# Patient Record
Sex: MALE | Race: WHITE | Employment: FULL TIME | URBAN - METROPOLITAN AREA
[De-identification: names, ages, dates, MRNs, and addresses within clinical notes are randomized per-mention and may not be internally consistent; named-entity substitution may affect disease eponyms.]

---

## 2018-06-13 ENCOUNTER — OFFICE VISIT (OUTPATIENT)
Dept: FAMILY MEDICINE CLINIC | Age: 48
End: 2018-06-13
Payer: COMMERCIAL

## 2018-06-13 VITALS
HEART RATE: 96 BPM | HEIGHT: 73 IN | DIASTOLIC BLOOD PRESSURE: 82 MMHG | SYSTOLIC BLOOD PRESSURE: 130 MMHG | WEIGHT: 209 LBS | TEMPERATURE: 97.8 F | OXYGEN SATURATION: 96 % | BODY MASS INDEX: 27.7 KG/M2

## 2018-06-13 DIAGNOSIS — M54.40 BACK PAIN OF LUMBAR REGION WITH SCIATICA: Primary | ICD-10-CM

## 2018-06-13 PROCEDURE — 99213 OFFICE O/P EST LOW 20 MIN: CPT | Performed by: NURSE PRACTITIONER

## 2018-06-13 RX ORDER — TIZANIDINE 4 MG/1
TABLET ORAL
Refills: 0 | Status: ON HOLD | COMMUNITY
Start: 2018-06-11 | End: 2018-08-16 | Stop reason: ALTCHOICE

## 2018-06-13 RX ORDER — ATORVASTATIN CALCIUM 20 MG/1
TABLET, FILM COATED ORAL
Refills: 3 | COMMUNITY
Start: 2018-05-04 | End: 2019-04-25

## 2018-06-13 RX ORDER — PAROXETINE HYDROCHLORIDE 20 MG/1
TABLET, FILM COATED ORAL
Refills: 3 | COMMUNITY
Start: 2018-05-06 | End: 2019-04-25

## 2018-06-13 RX ORDER — METHYLPREDNISOLONE 4 MG/1
TABLET ORAL
Qty: 1 KIT | Refills: 0 | Status: SHIPPED | OUTPATIENT
Start: 2018-06-13 | End: 2018-06-19

## 2018-06-13 RX ORDER — MELOXICAM 7.5 MG/1
TABLET ORAL
Refills: 0 | COMMUNITY
Start: 2018-06-11 | End: 2018-06-15 | Stop reason: ALTCHOICE

## 2018-06-13 RX ORDER — PAROXETINE 10 MG/1
TABLET, FILM COATED ORAL
Refills: 3 | COMMUNITY
Start: 2018-05-06 | End: 2019-04-25

## 2018-06-13 ASSESSMENT — ENCOUNTER SYMPTOMS
BOWEL INCONTINENCE: 0
ABDOMINAL PAIN: 0
BACK PAIN: 1

## 2018-06-15 ENCOUNTER — OFFICE VISIT (OUTPATIENT)
Dept: FAMILY MEDICINE CLINIC | Age: 48
End: 2018-06-15
Payer: COMMERCIAL

## 2018-06-15 VITALS
OXYGEN SATURATION: 97 % | HEIGHT: 73 IN | BODY MASS INDEX: 27.73 KG/M2 | HEART RATE: 82 BPM | WEIGHT: 209.2 LBS | SYSTOLIC BLOOD PRESSURE: 126 MMHG | DIASTOLIC BLOOD PRESSURE: 80 MMHG | TEMPERATURE: 98.3 F

## 2018-06-15 DIAGNOSIS — F34.1 DYSTHYMIA: ICD-10-CM

## 2018-06-15 DIAGNOSIS — M25.552 LEFT HIP PAIN: ICD-10-CM

## 2018-06-15 DIAGNOSIS — M51.36 DDD (DEGENERATIVE DISC DISEASE), LUMBAR: ICD-10-CM

## 2018-06-15 DIAGNOSIS — M54.32 SCIATICA OF LEFT SIDE: Primary | ICD-10-CM

## 2018-06-15 PROCEDURE — 99214 OFFICE O/P EST MOD 30 MIN: CPT | Performed by: NURSE PRACTITIONER

## 2018-06-15 RX ORDER — GABAPENTIN 100 MG/1
100 CAPSULE ORAL 3 TIMES DAILY
Qty: 90 CAPSULE | Refills: 0 | Status: ON HOLD | OUTPATIENT
Start: 2018-06-15 | End: 2018-08-16 | Stop reason: ALTCHOICE

## 2018-06-15 ASSESSMENT — ENCOUNTER SYMPTOMS
BACK PAIN: 1
ABDOMINAL PAIN: 0
EYE DISCHARGE: 0
BLOOD IN STOOL: 0
SHORTNESS OF BREATH: 0
COUGH: 0

## 2018-06-15 ASSESSMENT — PATIENT HEALTH QUESTIONNAIRE - PHQ9
SUM OF ALL RESPONSES TO PHQ9 QUESTIONS 1 & 2: 0
1. LITTLE INTEREST OR PLEASURE IN DOING THINGS: 0
SUM OF ALL RESPONSES TO PHQ QUESTIONS 1-9: 0
2. FEELING DOWN, DEPRESSED OR HOPELESS: 0

## 2018-06-29 ENCOUNTER — HOSPITAL ENCOUNTER (OUTPATIENT)
Age: 48
Discharge: HOME | End: 2018-07-01
Payer: COMMERCIAL

## 2018-06-29 ENCOUNTER — HOSPITAL ENCOUNTER (OUTPATIENT)
Dept: GENERAL RADIOLOGY | Age: 48
Discharge: HOME | End: 2018-07-01
Payer: COMMERCIAL

## 2018-06-29 ENCOUNTER — OFFICE VISIT (OUTPATIENT)
Dept: FAMILY MEDICINE CLINIC | Age: 48
End: 2018-06-29
Payer: COMMERCIAL

## 2018-06-29 VITALS
TEMPERATURE: 97.5 F | HEART RATE: 68 BPM | SYSTOLIC BLOOD PRESSURE: 126 MMHG | BODY MASS INDEX: 27.83 KG/M2 | DIASTOLIC BLOOD PRESSURE: 74 MMHG | OXYGEN SATURATION: 98 % | HEIGHT: 73 IN | WEIGHT: 210 LBS

## 2018-06-29 DIAGNOSIS — M25.552 LEFT HIP PAIN: ICD-10-CM

## 2018-06-29 DIAGNOSIS — M51.36 DDD (DEGENERATIVE DISC DISEASE), LUMBAR: ICD-10-CM

## 2018-06-29 DIAGNOSIS — M25.552 LEFT HIP PAIN: Primary | ICD-10-CM

## 2018-06-29 DIAGNOSIS — M54.32 SCIATICA OF LEFT SIDE: ICD-10-CM

## 2018-06-29 PROCEDURE — 73502 X-RAY EXAM HIP UNI 2-3 VIEWS: CPT

## 2018-06-29 PROCEDURE — 99213 OFFICE O/P EST LOW 20 MIN: CPT | Performed by: NURSE PRACTITIONER

## 2018-06-29 RX ORDER — HYDROCODONE BITARTRATE AND ACETAMINOPHEN 5; 325 MG/1; MG/1
TABLET ORAL
COMMUNITY
Start: 2018-06-25 | End: 2018-07-06 | Stop reason: ALTCHOICE

## 2018-06-29 RX ORDER — CYCLOBENZAPRINE HCL 10 MG
10 TABLET ORAL 3 TIMES DAILY PRN
Qty: 30 TABLET | Refills: 0 | Status: SHIPPED | OUTPATIENT
Start: 2018-06-29 | End: 2018-07-05 | Stop reason: SDUPTHER

## 2018-06-29 RX ORDER — CYCLOBENZAPRINE HCL 10 MG
TABLET ORAL
COMMUNITY
Start: 2018-06-25 | End: 2018-06-29 | Stop reason: SDUPTHER

## 2018-06-29 ASSESSMENT — ENCOUNTER SYMPTOMS
EYE DISCHARGE: 0
ABDOMINAL PAIN: 0
BLOOD IN STOOL: 0
BACK PAIN: 0
COUGH: 0
SHORTNESS OF BREATH: 0

## 2018-07-03 ENCOUNTER — HOSPITAL ENCOUNTER (OUTPATIENT)
Dept: PHYSICAL THERAPY | Facility: CLINIC | Age: 48
Setting detail: THERAPIES SERIES
Discharge: HOME | End: 2018-07-03
Payer: COMMERCIAL

## 2018-07-03 PROCEDURE — 97140 MANUAL THERAPY 1/> REGIONS: CPT

## 2018-07-03 PROCEDURE — 97161 PT EVAL LOW COMPLEX 20 MIN: CPT

## 2018-07-03 NOTE — FLOWSHEET NOTE
Disability  [] Other:           Return to work:   Job/ADL Description:      Pain:  [] Yes  [] No Location: L hip, L leg Pain Rating: (0-10 scale) 6-7/10 now, 8/10 at worst with sitting for long periods of time. Pain altered Tx:  [] Yes  [] No  Action:  Symptoms:  [] Improving [x] Worsening [] Same  Better:  [] AM    [] PM    [] Sit    [] Rise/Sit    []Stand    [] Walk    [x] Lying    [] Other:  Worse: [] AM    [] PM    [x] Sit    [] Rise/Sit    []Stand    [] Walk    [] Lying    [] Bend                             [] Valsalva    [] Other:  Sleep: [] OK    [x] Disturbed: when rolls over onto L hip but can go back to sleep     Objective:   STRENGTH  ROM    Left Right Cervical All wnl   L1-2 Hip Flex wnl wnl Flexion    Hip Abd wnl wnl Extension    L3-4 Knee Ext wnl wnl Rotation L R   L4 Ankle DF wnl wnl Sidebend L R   L5 EHL NT NT Retraction    S1 Plant. Flex wnl wnl Lumbar    Abdominals   Flexion wnl   Erector Spinae   Extension wnl      Rotation L  wnl R  wnl   Hip ext 5  Sidebend L  wnl R  wnl   Hip ER 5   LE     Glute med  5  Hip ER  wnl wnl      Hip IR  limited  limited       Ext  <10 deg <20 deg                         TESTS (+/-) LEFT RIGHT Not Tested   SLR [] sit [] supine - - []   Hamstring (SLR) + tightness + tightness  []   SKTC   []   DKTC   []   Slump/Dural -  []   SI JT   []   GRACIE + - []   Joint Mobility wnl wnl []   Christie Tests ? Pain ?  Pain No Change Not Tested   RFIS [] [] [x] []   FAM [] [] [x] []   RFIL [] [] [x] []   REIL [] [] [x] []   Rep Prot [] [] [] [x]   Rep Retract [] [] [] [x]       OBSERVATION No Deficit Deficit Not Tested Comments   Posture       Forward Head [x] [] []    Rounded Shoulders [x] [] []    Kyphosis [] [] []    Lordosis [] [] []    Lateral Shift [] [] []    Scoliosis [] [] []    Iliac Crest [] [] []    PSIS [] [] []    ASIS [] [x] []    Genu Valgus [] [] []    Genu Varus [] [] []    Genu Recurvatum [] [] []    Pronation [] [] []    Supination [] [] []    Leg Length Discrp [] [] []    Slumped Sitting [] [] []    Palpation [] [x] [] TTP over piriformis and hip flexors    Sensation [x] [] []    Edema [x] [] []    Neurological [] [x] []      FUNCTION Normal Difficult Unable   Sitting [] [] [x]   Standing [x] [] []   Ambulation [x] [] []   Groom/Dress [x] [] []   Lift/Carry [x] []  []   Stairs [] [] []   Bending [] [] []   OH reach [] [] []   Sit to Stand [] [] []     Comments:  LEFS 53/80 indicating 34% disability   B and SL squat: does not reproduce sx. Slight quad dominance but overall good form. Joint mobility: joint play T12-S1 wnl, did not reproduce sx    Assessment: PT presents today with low back/L hip and leg pain and radicular N/T. Pt notices sx the most with sitting. No directional preference or major strength deficits were noted. ROM deficits include decreased hip IR, ext L>R and piriformis pain/tightness. Sx are believed to be originating from leg length discrepancy d/t tight hip flexors and piriformis. R longer than L which was corrected with DI to prox/dist hip flexor and piriformis. Plan is to continue with manual and stretching and adding hip strengthening prn. Problems:    [] ? Back Pain:    [] ? Cervical Pain:    [x] ? ROM: hip ext/IR    [x] ? Strength:   [x] ? Function:  [] Postural Deviations  [] Gait Deviations  [] Other:    STG: (to be met in 10 treatments)  1. ? Pain: in L hip and leg  2. ? ROM: hip extension and IR  3. Leg length equal   4. ? Function: able to sit ~15 minutes without severe radicular sx  5. Independent with Home Exercise Programs  6. Demonstrate Knowledge of fall prevention  LTG: (to be met in 20 treatments)  1. Able to sit/drive ~2 hours without significant radicular sx  2. Hip extension ROM wnl and equal on both sides   3.  LEFS score >70/80 indicating <12% disability    Patient goals: none written     Rehab Potential:  [x] Good  [] Fair  [] Poor   Suggested Professional Referral:  [x] No  [] Yes:  Barriers to Goal Vasocompression     []  Other       TOTAL TREATMENT TIME: 50    Time in: 1500      Time out: 1550    Electronically signed by: SANDRA Zepeda    Physician Signature:________________________________Date:__________________  By signing above or cosigning this note, I have reviewed this plan of care and certify a need for medically necessary rehabilitation services.      *PLEASE SIGN ABOVE AND FAX BACK ALL PAGES*

## 2018-07-06 ENCOUNTER — TELEPHONE (OUTPATIENT)
Dept: FAMILY MEDICINE CLINIC | Age: 48
End: 2018-07-06

## 2018-07-06 DIAGNOSIS — M51.36 DDD (DEGENERATIVE DISC DISEASE), LUMBAR: ICD-10-CM

## 2018-07-06 DIAGNOSIS — M25.552 LEFT HIP PAIN: ICD-10-CM

## 2018-07-06 DIAGNOSIS — M54.32 SCIATICA OF LEFT SIDE: Primary | ICD-10-CM

## 2018-07-06 RX ORDER — HYDROCODONE BITARTRATE AND ACETAMINOPHEN 5; 325 MG/1; MG/1
1 TABLET ORAL EVERY 8 HOURS PRN
Qty: 21 TABLET | Refills: 0 | Status: SHIPPED | OUTPATIENT
Start: 2018-07-06 | End: 2018-07-09

## 2018-07-06 NOTE — FLOWSHEET NOTE
[] Howard Marydel        Outpatient Physical                Therapy       [address]       Phone: [phone]       Fax: [phone] [] [facility] at [address]       Phone: [phone]       Fax: [phone] [x] [facility]. [address] [place] [address]     [address] [facility]      Phone: [phone]      Fax:  [phone] [facility] Outpatient    [address] [facility]  Phone [phone]  Fax  [phone]     Physical Therapy Cancel/No Show note    Date: 2018  Patient: Julia Alcocer  : 1970  MRN: [id number]    Cancels/No Shows to date: For all scheduled appointments patient:  [x]  Cancelled   []  Rescheduled appointment  []  No-show     Reason given by patient:  []  Patient ill  []  Conflicting appointment  []  No transportation    []  Conflict with work  []  No reason given  []  Weather related  [x]  Other:      Comments: Patient called and stated his doctor wants all PT appointments cancelled that are scheduled up to 18. Patient stated he will have surgery. Patient stated he will probably be back for therapy after surgery.     []  Next appointment was confirmed    Electronically signed by: King Mattson

## 2018-07-09 ENCOUNTER — HOSPITAL ENCOUNTER (OUTPATIENT)
Dept: PHYSICAL THERAPY | Facility: CLINIC | Age: 48
Setting detail: THERAPIES SERIES
Discharge: HOME | End: 2018-07-09
Payer: COMMERCIAL

## 2018-07-11 ENCOUNTER — APPOINTMENT (OUTPATIENT)
Dept: PHYSICAL THERAPY | Facility: CLINIC | Age: 48
End: 2018-07-11
Payer: COMMERCIAL

## 2018-07-16 ENCOUNTER — APPOINTMENT (OUTPATIENT)
Dept: PHYSICAL THERAPY | Facility: CLINIC | Age: 48
End: 2018-07-16
Payer: COMMERCIAL

## 2018-07-19 ENCOUNTER — APPOINTMENT (OUTPATIENT)
Dept: PHYSICAL THERAPY | Facility: CLINIC | Age: 48
End: 2018-07-19
Payer: COMMERCIAL

## 2018-07-31 ENCOUNTER — HOSPITAL ENCOUNTER (OUTPATIENT)
Dept: PHYSICAL THERAPY | Facility: CLINIC | Age: 48
Setting detail: THERAPIES SERIES
Discharge: HOME | End: 2018-07-31
Payer: COMMERCIAL

## 2018-07-31 PROCEDURE — 97164 PT RE-EVAL EST PLAN CARE: CPT

## 2018-07-31 PROCEDURE — 97140 MANUAL THERAPY 1/> REGIONS: CPT

## 2018-08-02 ENCOUNTER — HOSPITAL ENCOUNTER (OUTPATIENT)
Dept: PHYSICAL THERAPY | Facility: CLINIC | Age: 48
Setting detail: THERAPIES SERIES
Discharge: HOME | End: 2018-08-02
Payer: COMMERCIAL

## 2018-08-02 PROCEDURE — 97140 MANUAL THERAPY 1/> REGIONS: CPT

## 2018-08-06 ENCOUNTER — HOSPITAL ENCOUNTER (OUTPATIENT)
Dept: PHYSICAL THERAPY | Facility: CLINIC | Age: 48
Setting detail: THERAPIES SERIES
Discharge: HOME | End: 2018-08-06
Payer: COMMERCIAL

## 2018-08-06 PROCEDURE — 97110 THERAPEUTIC EXERCISES: CPT

## 2018-08-06 PROCEDURE — 97140 MANUAL THERAPY 1/> REGIONS: CPT

## 2018-08-08 ENCOUNTER — HOSPITAL ENCOUNTER (OUTPATIENT)
Dept: PHYSICAL THERAPY | Facility: CLINIC | Age: 48
Setting detail: THERAPIES SERIES
Discharge: HOME | End: 2018-08-08
Payer: COMMERCIAL

## 2018-08-08 PROCEDURE — 97110 THERAPEUTIC EXERCISES: CPT

## 2018-08-08 PROCEDURE — 97140 MANUAL THERAPY 1/> REGIONS: CPT

## 2018-08-08 NOTE — FLOWSHEET NOTE
[] Maddison Calhoun       Outpatient Physical        Therapy       [address]       Phone: [phone]       Fax: [phone] [] [facility] at [address] [facility]       Phone: [phone]       Fax: [phone] [] Aleksandar. [address] [place] [address]   Phone: [phone]   Fax:  [phone]     Physical Therapy Daily Treatment/Discharge Note    Date:  2018  Patient Name:  Demarco Rowe            :  1970  MRN: [id number]  Physician: Dr. Selene Russell: Darleen Clark hip pain, L Sciatica, DDD             Rehab Codes: M25.552  Onset Date: 18                                                         Next 's appt.: none listed  Visit# / total visits:            Cancels/No Shows: 0/0    Subjective:    Pain:  [x] Yes  [] No Location:low back, L hip Pain Rating: (0-10 scale) 8/10  Pain altered Tx:  [] No  [] Yes  Action:  Comments: Pt reports new pain on the top of his foot that is \"stabbing\" and has never happened before. Objective:  Modalities: none  Precautions:  Exercises:  Exercise Reps/ Time Weight/ Level Comments   Prone press-up 3x10   Repeat between each mobilization technique    Knees side-side supine  2x10   In between manual techniques                      Other:  Hip mobs  PA mobs with extension 3x10 grade III-IV   PA mobs static grade IV 3x10   DI to piriformis  Long axis traction 3x10 grade IV, 3x1' at end range with gentle grade II in between   Lateral traction at 55 deg flex grade IV 3x10       Treatment Charges: Mins Units   []  Modalities     [x]  Ther Exercise 10 1   [x]  Manual Therapy 30 2   []  Ther Activities     []  Aquatics     []  Vasocompression     []  Other     Total Treatment time 40 3        Assessment: [] Progressing toward goals. [x] No change.  No resolution of pain or neurologic sx in the L leg. [x] Other: Hip extension and other motions are still very limited. Pt has now been in for 2 weeks with no resolution in pain and continues to experience exacerbated neurologic sx. Today during therapy his L adductors began spasming without pain with no musculoskeletal origin. Pt reports he feels like he has more mobility when he leaves but therapy has never affected his pain which has only gotten worse in the last 2 weeks. No progression towards his short or long term goals. At this time PT and pt discussed that therapy is not appropriate for this pt and he has been DC'ed for referral back to neuro. Pt agreeable to termination of therapy. Goals:  STG: (to be met in 10 treatments)  1. ? Pain: in L hip and leg  2. ? ROM: hip extension and IR  3. Leg length equal   4. ? Function: able to sit ~15 minutes without severe radicular sx  5. Independent with Home Exercise Programs  6. Demonstrate Knowledge of fall prevention  LTG: (to be met in 20 treatments)  1. Able to sit/drive ~2 hours without significant radicular sx  2. Hip extension ROM wnl and equal on both sides   3. LEFS score >70/80 indicating <12% disability             Patient goals: none    Pt. Education:  [x] Yes  [] No  [] Reviewed Prior HEP/Ed  Method of Education: [] Verbal  [] Demo  [] Written  Comprehension of Education:  [x] Verbalizes understanding. [] Demonstrates understanding. [] Needs review. [] Demonstrates/verbalizes HEP/Ed previously given. Plan: [] Continue per plan of care. [x] Other: DC for outside referral back to neuro.        Time In: 1300           Time Out: 1340    Electronically signed by:  SANDRA Cano  This Documentation has been reviewed by GELA Ramon PT

## 2018-08-15 ENCOUNTER — ANESTHESIA EVENT (OUTPATIENT)
Dept: OPERATING ROOM | Age: 48
End: 2018-08-15
Payer: COMMERCIAL

## 2018-08-16 ENCOUNTER — APPOINTMENT (OUTPATIENT)
Dept: GENERAL RADIOLOGY | Age: 48
End: 2018-08-16
Attending: NEUROLOGICAL SURGERY
Payer: COMMERCIAL

## 2018-08-16 ENCOUNTER — HOSPITAL ENCOUNTER (OUTPATIENT)
Age: 48
Setting detail: OUTPATIENT SURGERY
Discharge: HOME | End: 2018-08-16
Attending: NEUROLOGICAL SURGERY | Admitting: NEUROLOGICAL SURGERY
Payer: COMMERCIAL

## 2018-08-16 ENCOUNTER — ANESTHESIA (OUTPATIENT)
Dept: OPERATING ROOM | Age: 48
End: 2018-08-16
Payer: COMMERCIAL

## 2018-08-16 VITALS
BODY MASS INDEX: 27.83 KG/M2 | OXYGEN SATURATION: 99 % | WEIGHT: 210 LBS | RESPIRATION RATE: 16 BRPM | SYSTOLIC BLOOD PRESSURE: 148 MMHG | HEIGHT: 73 IN | DIASTOLIC BLOOD PRESSURE: 76 MMHG | HEART RATE: 72 BPM | TEMPERATURE: 97.5 F

## 2018-08-16 VITALS — TEMPERATURE: 95.9 F | DIASTOLIC BLOOD PRESSURE: 73 MMHG | OXYGEN SATURATION: 97 % | SYSTOLIC BLOOD PRESSURE: 125 MMHG

## 2018-08-16 PROBLEM — M48.061 SPINAL STENOSIS, LUMBAR: Status: ACTIVE | Noted: 2018-08-16

## 2018-08-16 LAB
ABSOLUTE EOS #: 0.19 K/UL (ref 0–0.44)
ABSOLUTE IMMATURE GRANULOCYTE: <0.03 K/UL (ref 0–0.3)
ABSOLUTE LYMPH #: 2.76 K/UL (ref 1.1–3.7)
ABSOLUTE MONO #: 0.55 K/UL (ref 0.1–1.2)
BASOPHILS # BLD: 1 % (ref 0–2)
BASOPHILS ABSOLUTE: 0.03 K/UL (ref 0–0.2)
DIFFERENTIAL TYPE: ABNORMAL
EOSINOPHILS RELATIVE PERCENT: 3 % (ref 1–4)
GFR NON-AFRICAN AMERICAN: >60 ML/MIN
GFR SERPL CREATININE-BSD FRML MDRD: >60 ML/MIN
GFR SERPL CREATININE-BSD FRML MDRD: NORMAL ML/MIN/{1.73_M2}
HCT VFR BLD CALC: 43.9 % (ref 40.7–50.3)
HEMOGLOBIN: 14.9 G/DL (ref 13–17)
IMMATURE GRANULOCYTES: 0 %
LYMPHOCYTES # BLD: 42 % (ref 24–43)
MCH RBC QN AUTO: 33.6 PG (ref 25.2–33.5)
MCHC RBC AUTO-ENTMCNC: 33.9 G/DL (ref 28.4–34.8)
MCV RBC AUTO: 98.9 FL (ref 82.6–102.9)
MONOCYTES # BLD: 8 % (ref 3–12)
NRBC AUTOMATED: 0 PER 100 WBC
PDW BLD-RTO: 11.5 % (ref 11.8–14.4)
PLATELET # BLD: 288 K/UL (ref 138–453)
PLATELET ESTIMATE: ABNORMAL
PMV BLD AUTO: 9.8 FL (ref 8.1–13.5)
POC CHLORIDE: 105 MMOL/L (ref 98–107)
POC CREATININE: 0.89 MG/DL (ref 0.51–1.19)
POC IONIZED CALCIUM: 1.19 MMOL/L (ref 1.15–1.33)
POC POTASSIUM: 4.4 MMOL/L (ref 3.5–4.5)
POC SODIUM: 141 MMOL/L (ref 138–146)
RBC # BLD: 4.44 M/UL (ref 4.21–5.77)
RBC # BLD: ABNORMAL 10*6/UL
SEG NEUTROPHILS: 46 % (ref 36–65)
SEGMENTED NEUTROPHILS ABSOLUTE COUNT: 3.03 K/UL (ref 1.5–8.1)
WBC # BLD: 6.6 K/UL (ref 3.5–11.3)
WBC # BLD: ABNORMAL 10*3/UL

## 2018-08-16 PROCEDURE — 7100000000 HC PACU RECOVERY - FIRST 15 MIN: Performed by: NEUROLOGICAL SURGERY

## 2018-08-16 PROCEDURE — 2500000003 HC RX 250 WO HCPCS: Performed by: NURSE ANESTHETIST, CERTIFIED REGISTERED

## 2018-08-16 PROCEDURE — 82435 ASSAY OF BLOOD CHLORIDE: CPT

## 2018-08-16 PROCEDURE — 7100000041 HC SPAR PHASE II RECOVERY - ADDTL 15 MIN: Performed by: NEUROLOGICAL SURGERY

## 2018-08-16 PROCEDURE — 6360000002 HC RX W HCPCS: Performed by: ANESTHESIOLOGY

## 2018-08-16 PROCEDURE — 3600000004 HC SURGERY LEVEL 4 BASE: Performed by: NEUROLOGICAL SURGERY

## 2018-08-16 PROCEDURE — 2580000003 HC RX 258: Performed by: NEUROLOGICAL SURGERY

## 2018-08-16 PROCEDURE — 84132 ASSAY OF SERUM POTASSIUM: CPT

## 2018-08-16 PROCEDURE — 7100000001 HC PACU RECOVERY - ADDTL 15 MIN: Performed by: NEUROLOGICAL SURGERY

## 2018-08-16 PROCEDURE — 82565 ASSAY OF CREATININE: CPT

## 2018-08-16 PROCEDURE — 3600000014 HC SURGERY LEVEL 4 ADDTL 15MIN: Performed by: NEUROLOGICAL SURGERY

## 2018-08-16 PROCEDURE — 6360000002 HC RX W HCPCS: Performed by: NURSE ANESTHETIST, CERTIFIED REGISTERED

## 2018-08-16 PROCEDURE — 2580000003 HC RX 258: Performed by: NURSE ANESTHETIST, CERTIFIED REGISTERED

## 2018-08-16 PROCEDURE — 7100000040 HC SPAR PHASE II RECOVERY - FIRST 15 MIN: Performed by: NEUROLOGICAL SURGERY

## 2018-08-16 PROCEDURE — 6370000000 HC RX 637 (ALT 250 FOR IP): Performed by: ANESTHESIOLOGY

## 2018-08-16 PROCEDURE — 2500000003 HC RX 250 WO HCPCS: Performed by: NEUROLOGICAL SURGERY

## 2018-08-16 PROCEDURE — 2720000010 HC SURG SUPPLY STERILE: Performed by: NEUROLOGICAL SURGERY

## 2018-08-16 PROCEDURE — 3700000001 HC ADD 15 MINUTES (ANESTHESIA): Performed by: NEUROLOGICAL SURGERY

## 2018-08-16 PROCEDURE — 85025 COMPLETE CBC W/AUTO DIFF WBC: CPT

## 2018-08-16 PROCEDURE — 6370000000 HC RX 637 (ALT 250 FOR IP): Performed by: NEUROLOGICAL SURGERY

## 2018-08-16 PROCEDURE — 82330 ASSAY OF CALCIUM: CPT

## 2018-08-16 PROCEDURE — 72020 X-RAY EXAM OF SPINE 1 VIEW: CPT

## 2018-08-16 PROCEDURE — 2709999900 HC NON-CHARGEABLE SUPPLY: Performed by: NEUROLOGICAL SURGERY

## 2018-08-16 PROCEDURE — 84295 ASSAY OF SERUM SODIUM: CPT

## 2018-08-16 PROCEDURE — 3700000000 HC ANESTHESIA ATTENDED CARE: Performed by: NEUROLOGICAL SURGERY

## 2018-08-16 RX ORDER — SODIUM CHLORIDE 0.9 % (FLUSH) 0.9 %
10 SYRINGE (ML) INJECTION PRN
Status: CANCELLED | OUTPATIENT
Start: 2018-08-16

## 2018-08-16 RX ORDER — CYCLOBENZAPRINE HCL 10 MG
10 TABLET ORAL
Status: ON HOLD | COMMUNITY
Start: 2018-06-25 | End: 2018-08-16 | Stop reason: HOSPADM

## 2018-08-16 RX ORDER — TIZANIDINE 2 MG/1
2 TABLET ORAL EVERY 8 HOURS
Qty: 45 TABLET | Refills: 0 | Status: SHIPPED | OUTPATIENT
Start: 2018-08-16 | End: 2019-04-25

## 2018-08-16 RX ORDER — MIDAZOLAM HYDROCHLORIDE 1 MG/ML
2 INJECTION INTRAMUSCULAR; INTRAVENOUS
Status: CANCELLED | OUTPATIENT
Start: 2018-08-16 | End: 2018-08-16

## 2018-08-16 RX ORDER — MIDAZOLAM HYDROCHLORIDE 1 MG/ML
INJECTION INTRAMUSCULAR; INTRAVENOUS PRN
Status: DISCONTINUED | OUTPATIENT
Start: 2018-08-16 | End: 2018-08-16 | Stop reason: SDUPTHER

## 2018-08-16 RX ORDER — ONDANSETRON 2 MG/ML
4 INJECTION INTRAMUSCULAR; INTRAVENOUS ONCE
Status: CANCELLED | OUTPATIENT
Start: 2018-08-16 | End: 2018-08-16

## 2018-08-16 RX ORDER — PROPOFOL 10 MG/ML
INJECTION, EMULSION INTRAVENOUS PRN
Status: DISCONTINUED | OUTPATIENT
Start: 2018-08-16 | End: 2018-08-16 | Stop reason: SDUPTHER

## 2018-08-16 RX ORDER — OXYCODONE HYDROCHLORIDE AND ACETAMINOPHEN 5; 325 MG/1; MG/1
1 TABLET ORAL EVERY 4 HOURS PRN
COMMUNITY
End: 2019-04-25

## 2018-08-16 RX ORDER — BUPIVACAINE HYDROCHLORIDE AND EPINEPHRINE 2.5; 5 MG/ML; UG/ML
INJECTION, SOLUTION EPIDURAL; INFILTRATION; INTRACAUDAL; PERINEURAL PRN
Status: DISCONTINUED | OUTPATIENT
Start: 2018-08-16 | End: 2018-08-16 | Stop reason: HOSPADM

## 2018-08-16 RX ORDER — CEFAZOLIN SODIUM 1 G/3ML
INJECTION, POWDER, FOR SOLUTION INTRAMUSCULAR; INTRAVENOUS PRN
Status: DISCONTINUED | OUTPATIENT
Start: 2018-08-16 | End: 2018-08-16 | Stop reason: SDUPTHER

## 2018-08-16 RX ORDER — LIDOCAINE HYDROCHLORIDE 10 MG/ML
INJECTION, SOLUTION INFILTRATION; PERINEURAL PRN
Status: DISCONTINUED | OUTPATIENT
Start: 2018-08-16 | End: 2018-08-16 | Stop reason: SDUPTHER

## 2018-08-16 RX ORDER — SODIUM CHLORIDE 0.9 % (FLUSH) 0.9 %
10 SYRINGE (ML) INJECTION EVERY 12 HOURS SCHEDULED
Status: DISCONTINUED | OUTPATIENT
Start: 2018-08-16 | End: 2018-08-16 | Stop reason: HOSPADM

## 2018-08-16 RX ORDER — GLYCOPYRROLATE 1 MG/5 ML
SYRINGE (ML) INTRAVENOUS PRN
Status: DISCONTINUED | OUTPATIENT
Start: 2018-08-16 | End: 2018-08-16 | Stop reason: SDUPTHER

## 2018-08-16 RX ORDER — TIZANIDINE 4 MG/1
2 TABLET ORAL 3 TIMES DAILY
Qty: 45 TABLET | Refills: 0 | Status: SHIPPED | OUTPATIENT
Start: 2018-08-16 | End: 2019-04-25

## 2018-08-16 RX ORDER — LANOLIN ALCOHOL/MO/W.PET/CERES
1000 CREAM (GRAM) TOPICAL DAILY
COMMUNITY
End: 2021-04-19

## 2018-08-16 RX ORDER — ONDANSETRON 2 MG/ML
INJECTION INTRAMUSCULAR; INTRAVENOUS PRN
Status: DISCONTINUED | OUTPATIENT
Start: 2018-08-16 | End: 2018-08-16 | Stop reason: SDUPTHER

## 2018-08-16 RX ORDER — SODIUM CHLORIDE, SODIUM LACTATE, POTASSIUM CHLORIDE, CALCIUM CHLORIDE 600; 310; 30; 20 MG/100ML; MG/100ML; MG/100ML; MG/100ML
INJECTION, SOLUTION INTRAVENOUS CONTINUOUS PRN
Status: DISCONTINUED | OUTPATIENT
Start: 2018-08-16 | End: 2018-08-16 | Stop reason: SDUPTHER

## 2018-08-16 RX ORDER — SODIUM CHLORIDE 0.9 % (FLUSH) 0.9 %
10 SYRINGE (ML) INJECTION EVERY 12 HOURS SCHEDULED
Status: CANCELLED | OUTPATIENT
Start: 2018-08-16

## 2018-08-16 RX ORDER — ROCURONIUM BROMIDE 10 MG/ML
INJECTION, SOLUTION INTRAVENOUS PRN
Status: DISCONTINUED | OUTPATIENT
Start: 2018-08-16 | End: 2018-08-16 | Stop reason: SDUPTHER

## 2018-08-16 RX ORDER — DEXAMETHASONE SODIUM PHOSPHATE 10 MG/ML
INJECTION INTRAMUSCULAR; INTRAVENOUS PRN
Status: DISCONTINUED | OUTPATIENT
Start: 2018-08-16 | End: 2018-08-16 | Stop reason: SDUPTHER

## 2018-08-16 RX ORDER — FENTANYL CITRATE 50 UG/ML
25 INJECTION, SOLUTION INTRAMUSCULAR; INTRAVENOUS EVERY 5 MIN PRN
Status: COMPLETED | OUTPATIENT
Start: 2018-08-16 | End: 2018-08-16

## 2018-08-16 RX ORDER — LIDOCAINE HYDROCHLORIDE 10 MG/ML
1 INJECTION, SOLUTION EPIDURAL; INFILTRATION; INTRACAUDAL; PERINEURAL
Status: DISCONTINUED | OUTPATIENT
Start: 2018-08-16 | End: 2018-08-16 | Stop reason: HOSPADM

## 2018-08-16 RX ORDER — SODIUM CHLORIDE 0.9 % (FLUSH) 0.9 %
10 SYRINGE (ML) INJECTION PRN
Status: DISCONTINUED | OUTPATIENT
Start: 2018-08-16 | End: 2018-08-16 | Stop reason: HOSPADM

## 2018-08-16 RX ORDER — FENTANYL CITRATE 50 UG/ML
50 INJECTION, SOLUTION INTRAMUSCULAR; INTRAVENOUS EVERY 5 MIN PRN
Status: DISCONTINUED | OUTPATIENT
Start: 2018-08-16 | End: 2018-08-16 | Stop reason: HOSPADM

## 2018-08-16 RX ORDER — OXYCODONE HYDROCHLORIDE AND ACETAMINOPHEN 5; 325 MG/1; MG/1
1 TABLET ORAL
Status: COMPLETED | OUTPATIENT
Start: 2018-08-16 | End: 2018-08-16

## 2018-08-16 RX ADMIN — FENTANYL CITRATE 50 MCG: 50 INJECTION, SOLUTION INTRAMUSCULAR; INTRAVENOUS at 11:25

## 2018-08-16 RX ADMIN — Medication 0.2 MG: at 10:16

## 2018-08-16 RX ADMIN — OXYCODONE HYDROCHLORIDE AND ACETAMINOPHEN 1 TABLET: 5; 325 TABLET ORAL at 11:44

## 2018-08-16 RX ADMIN — FENTANYL CITRATE 50 MCG: 50 INJECTION, SOLUTION INTRAMUSCULAR; INTRAVENOUS at 10:10

## 2018-08-16 RX ADMIN — FENTANYL CITRATE 50 MCG: 50 INJECTION, SOLUTION INTRAMUSCULAR; INTRAVENOUS at 11:06

## 2018-08-16 RX ADMIN — Medication 0.6 MG: at 10:51

## 2018-08-16 RX ADMIN — SODIUM CHLORIDE, POTASSIUM CHLORIDE, SODIUM LACTATE AND CALCIUM CHLORIDE: 600; 310; 30; 20 INJECTION, SOLUTION INTRAVENOUS at 09:33

## 2018-08-16 RX ADMIN — MIDAZOLAM HYDROCHLORIDE 2 MG: 1 INJECTION, SOLUTION INTRAMUSCULAR; INTRAVENOUS at 09:39

## 2018-08-16 RX ADMIN — FENTANYL CITRATE 100 MCG: 50 INJECTION, SOLUTION INTRAMUSCULAR; INTRAVENOUS at 09:43

## 2018-08-16 RX ADMIN — FENTANYL CITRATE 50 MCG: 50 INJECTION, SOLUTION INTRAMUSCULAR; INTRAVENOUS at 10:07

## 2018-08-16 RX ADMIN — CEFAZOLIN 2000 MG: 1 INJECTION, POWDER, FOR SOLUTION INTRAMUSCULAR; INTRAVENOUS at 09:51

## 2018-08-16 RX ADMIN — ROCURONIUM BROMIDE 30 MG: 10 INJECTION INTRAVENOUS at 09:43

## 2018-08-16 RX ADMIN — NEOSTIGMINE METHYLSULFATE 3 MG: 1 INJECTION, SOLUTION INTRAMUSCULAR; INTRAVENOUS; SUBCUTANEOUS at 10:51

## 2018-08-16 RX ADMIN — PROPOFOL 200 MG: 10 INJECTION, EMULSION INTRAVENOUS at 09:43

## 2018-08-16 RX ADMIN — LIDOCAINE HYDROCHLORIDE 50 MG: 10 INJECTION, SOLUTION INFILTRATION; PERINEURAL at 09:43

## 2018-08-16 RX ADMIN — FENTANYL CITRATE 50 MCG: 50 INJECTION, SOLUTION INTRAMUSCULAR; INTRAVENOUS at 11:30

## 2018-08-16 RX ADMIN — PHENYLEPHRINE HYDROCHLORIDE 200 MCG: 10 INJECTION INTRAVENOUS at 10:34

## 2018-08-16 RX ADMIN — PHENYLEPHRINE HYDROCHLORIDE 200 MCG: 10 INJECTION INTRAVENOUS at 10:27

## 2018-08-16 RX ADMIN — ROCURONIUM BROMIDE 20 MG: 10 INJECTION INTRAVENOUS at 09:59

## 2018-08-16 RX ADMIN — DEXAMETHASONE SODIUM PHOSPHATE 10 MG: 10 INJECTION INTRAMUSCULAR; INTRAVENOUS at 09:49

## 2018-08-16 RX ADMIN — PHENYLEPHRINE HYDROCHLORIDE 100 MCG: 10 INJECTION INTRAVENOUS at 10:22

## 2018-08-16 RX ADMIN — ONDANSETRON 4 MG: 2 INJECTION, SOLUTION INTRAMUSCULAR; INTRAVENOUS at 10:51

## 2018-08-16 ASSESSMENT — PULMONARY FUNCTION TESTS
PIF_VALUE: 18
PIF_VALUE: 15
PIF_VALUE: 19
PIF_VALUE: 2
PIF_VALUE: 19
PIF_VALUE: 19
PIF_VALUE: 13
PIF_VALUE: 1
PIF_VALUE: 20
PIF_VALUE: 15
PIF_VALUE: 17
PIF_VALUE: 3
PIF_VALUE: 20
PIF_VALUE: 18
PIF_VALUE: 19
PIF_VALUE: 19
PIF_VALUE: 1
PIF_VALUE: 20
PIF_VALUE: 19
PIF_VALUE: 20
PIF_VALUE: 18
PIF_VALUE: 19
PIF_VALUE: 1
PIF_VALUE: 19
PIF_VALUE: 2
PIF_VALUE: 19
PIF_VALUE: 20
PIF_VALUE: 20
PIF_VALUE: 17
PIF_VALUE: 16
PIF_VALUE: 20
PIF_VALUE: 20
PIF_VALUE: 1
PIF_VALUE: 20
PIF_VALUE: 19
PIF_VALUE: 19
PIF_VALUE: 14
PIF_VALUE: 18
PIF_VALUE: 19
PIF_VALUE: 18
PIF_VALUE: 16
PIF_VALUE: 19
PIF_VALUE: 1
PIF_VALUE: 15
PIF_VALUE: 19
PIF_VALUE: 15
PIF_VALUE: 20
PIF_VALUE: 20
PIF_VALUE: 4
PIF_VALUE: 18
PIF_VALUE: 17
PIF_VALUE: 19
PIF_VALUE: 0
PIF_VALUE: 3
PIF_VALUE: 17
PIF_VALUE: 20
PIF_VALUE: 2
PIF_VALUE: 19
PIF_VALUE: 1
PIF_VALUE: 2
PIF_VALUE: 19
PIF_VALUE: 20
PIF_VALUE: 19
PIF_VALUE: 1
PIF_VALUE: 19
PIF_VALUE: 15
PIF_VALUE: 17
PIF_VALUE: 15
PIF_VALUE: 20
PIF_VALUE: 18
PIF_VALUE: 14
PIF_VALUE: 19
PIF_VALUE: 20
PIF_VALUE: 20

## 2018-08-16 ASSESSMENT — PAIN DESCRIPTION - LOCATION
LOCATION: BACK
LOCATION: BACK

## 2018-08-16 ASSESSMENT — PAIN SCALES - GENERAL
PAINLEVEL_OUTOF10: 7
PAINLEVEL_OUTOF10: 3
PAINLEVEL_OUTOF10: 7
PAINLEVEL_OUTOF10: 4
PAINLEVEL_OUTOF10: 4

## 2018-08-16 ASSESSMENT — PAIN DESCRIPTION - PAIN TYPE: TYPE: SURGICAL PAIN

## 2018-08-16 ASSESSMENT — PAIN - FUNCTIONAL ASSESSMENT: PAIN_FUNCTIONAL_ASSESSMENT: 0-10

## 2018-08-16 NOTE — PROGRESS NOTES
[phone] sister and mother at bedside. 1240 prescription called to [phone] [facility] upon request via pt and family. 1300 discharge instructions reviewed. All questions answered. Pt, sister and mother verbalize understanding. 1309 pt discharged in w/c with all personal  Items. Sister has prescription and instructions.

## 2018-08-16 NOTE — ANESTHESIA POSTPROCEDURE EVALUATION
Department of Anesthesiology  Postprocedure Note    Patient: Nick Bateman  MRN: [id number]  YOB: 1970  Date of evaluation: 8/16/2018  Time:  12:33 PM     Procedure Summary     Date:  08/16/18 Room / Location:  [address] OR    Anesthesia Start:  0920 Anesthesia Stop:  9238    Procedure:  LEFT LUMBAR LAMINECTOMY L3-4 (Left Spine Lumbar) Diagnosis:  (LUMBAR STENOSIS)    Surgeon:  Collette Sauers, MD Responsible Provider:  Nisreen Chaves MD    Anesthesia Type:  general ASA Status:  2          Anesthesia Type: general    Shruthi Phase I: Shruthi Score: 10    Shruthi Phase II:      Last vitals: Reviewed and per EMR flowsheets.        Anesthesia Post Evaluation    Patient location during evaluation: PACU  Patient participation: complete - patient participated  Level of consciousness: awake  Pain score: 3  Airway patency: patent  Nausea & Vomiting: no vomiting and no nausea  Complications: no  Cardiovascular status: blood pressure returned to baseline  Respiratory status: acceptable  Hydration status: euvolemic

## 2018-08-16 NOTE — H&P
No rhinorrhea   MOUTH/THROAT:  benign  NECK:supple, no lymphadenopathy  LUNGS: Clear to auscultation bilaterally, no wheezes. CARDIOVASCULAR: Heart sounds are normal.  Regular rate and rhythm without murmur. ABDOMEN: soft, non tender, non distended. EXTREMITIES: no edema bilateral lower extremities. IMPRESSIONS:   1. Lumbar disc disease  2.  has a past medical history of Chronic back pain; Depression; and Headache. PLANS:   1.  Lumbar laminectomy    ANNABELLE HAIDER PA-C  Electronically signed 8/16/2018 at 8:32 AM

## 2018-08-17 NOTE — OP NOTE
[facility]                                 OPERATIVE REPORT    PATIENT NAME: Edwin Benites                      :        1970  MED REC NO:   [id number]                             ROOM:  ACCOUNT NO:   [de-identified]                           ADMIT DATE: 2018  PROVIDER:     Jinnie Boas Hoeflinger    DATE OF PROCEDURE:  2018    SURGEON:  Jinnie Boas. MD Vanita    ASSISTANT:  Alea Narvaez PA-C    PREOPERATIVE DIAGNOSES:  Severe lateral recess stenosis with nerve root  impingement, L3-L4. POSTOPERATIVE DIAGNOSES:  Severe lateral recess stenosis with nerve root  impingement, L3-L4. PROCEDURE:  Left L3-L4 lumbar laminectomy with L4 nerve root foraminotomy. ANESTHESIA:  General endotracheal.    INDICATIONS:  The patient is a 69-year-old male who presents with ongoing  complaints of back as well as left lower extremity discomfort coursing in  the L4 nerve root distribution, consistent with ongoing lumbar  radiculopathy. Radiographic imaging in the form of an MRI lumbar spine  clearly demonstrated a left paracentral disk protrusion with inferior  migration into the foramen resulting in both severe lateral recess as well  as foraminal stenosis with nerve root impingement. Further treatment  options were discussed, conservative measures versus surgical intervention. After reviewing the radiographic images as well as discussing the details  and risks of the surgery, the patient wished to forego any further  conservative measures and favored surgical intervention. Thus, he was  scheduled for surgery as outlined above. DESCRIPTION OF PROCEDURE:  The patient was transported from the  preanesthesia area to the operating room. After the adequate induction of  general endotracheal anesthesia, the patient was placed onto the operating  table in the prone position onto a Robin frame.   The appropriate pressure  points about the body were carefully padded. Preoperative IV antibiotics  were given. The region over the lower back was then shaved, prepped and  draped in the usual sterile fashion. An incision was made in the midline  extending over the L3 and L4 spinous processes. This incision was carried  down through the underlying tissue to the level of the deep lumbar fascia. Next, using the monopolar instrument, the lumbar fascia was incised and the  dissection carried down left of midline, keeping immediately adjacent to  the L3 and L4 spinous processes. Next, standard subperiosteal dissection was carried out over the adjacent  hemilamina. The pars interarticularis was exposed as well. A radiopaque  instrument was now placed at the L3-L4 disk space level. An intraoperative  x-ray verified us to be at the above-stated level. This instrument was  removed. Dayna retractor was placed in the wound. Next, using the high-speed drill as well as 3-mm Kerrison instrument, the  inferior aspect of the left L3 hemilamina was removed to above the top of  the ligamentum flavum. Ligamentum flavum was then moved in a caudal  direction to the superior aspect of the L4 hemilamina. A small portion of  the superior aspect of the L4 hemilamina was removed. Bony resection was  then carried laterally. The nerve root was identified within its foramen. With the nerve root identified, the bony ligamentous resection was then  carried rostrally. The facet joint was slightly further undercut and the  remaining ligamentous tissue removed. Bone was removed flush with the  pedicle adjacent to the exiting L4 nerve root, thus ensuring adequate  decompression. In this fashion, overall generous decompression of the  exiting L4 nerve root was accomplished from above its takeoff from the  thecal sac, well into the neural exit zone. The nerve root was then gently  mobilized medially.   There was gentle

## 2019-04-25 ENCOUNTER — APPOINTMENT (OUTPATIENT)
Dept: GENERAL RADIOLOGY | Age: 49
End: 2019-04-25
Payer: COMMERCIAL

## 2019-04-25 ENCOUNTER — HOSPITAL ENCOUNTER (EMERGENCY)
Age: 49
Discharge: HOME | End: 2019-04-25
Attending: EMERGENCY MEDICINE
Payer: COMMERCIAL

## 2019-04-25 VITALS
RESPIRATION RATE: 20 BRPM | OXYGEN SATURATION: 98 % | HEART RATE: 68 BPM | SYSTOLIC BLOOD PRESSURE: 121 MMHG | WEIGHT: 200 LBS | HEIGHT: 73 IN | TEMPERATURE: 97.7 F | DIASTOLIC BLOOD PRESSURE: 75 MMHG | BODY MASS INDEX: 26.51 KG/M2

## 2019-04-25 DIAGNOSIS — S68.119A TRAUMATIC AMPUTATION OF FINGERTIP, INITIAL ENCOUNTER: Primary | ICD-10-CM

## 2019-04-25 PROCEDURE — 73140 X-RAY EXAM OF FINGER(S): CPT

## 2019-04-25 PROCEDURE — 90471 IMMUNIZATION ADMIN: CPT | Performed by: EMERGENCY MEDICINE

## 2019-04-25 PROCEDURE — 90715 TDAP VACCINE 7 YRS/> IM: CPT | Performed by: EMERGENCY MEDICINE

## 2019-04-25 PROCEDURE — 6360000002 HC RX W HCPCS: Performed by: EMERGENCY MEDICINE

## 2019-04-25 PROCEDURE — 99283 EMERGENCY DEPT VISIT LOW MDM: CPT

## 2019-04-25 PROCEDURE — 6370000000 HC RX 637 (ALT 250 FOR IP): Performed by: EMERGENCY MEDICINE

## 2019-04-25 RX ORDER — CEPHALEXIN 250 MG/1
500 CAPSULE ORAL ONCE
Status: COMPLETED | OUTPATIENT
Start: 2019-04-25 | End: 2019-04-25

## 2019-04-25 RX ORDER — HYDROCODONE BITARTRATE AND ACETAMINOPHEN 5; 325 MG/1; MG/1
1 TABLET ORAL EVERY 6 HOURS PRN
Qty: 15 TABLET | Refills: 0 | Status: SHIPPED | OUTPATIENT
Start: 2019-04-25 | End: 2019-05-02

## 2019-04-25 RX ORDER — SIMVASTATIN 20 MG
40 TABLET ORAL NIGHTLY
COMMUNITY

## 2019-04-25 RX ORDER — CEPHALEXIN 500 MG/1
500 CAPSULE ORAL 4 TIMES DAILY
Qty: 40 CAPSULE | Refills: 0 | Status: SHIPPED | OUTPATIENT
Start: 2019-04-25 | End: 2019-05-05

## 2019-04-25 RX ORDER — OXYCODONE HYDROCHLORIDE AND ACETAMINOPHEN 5; 325 MG/1; MG/1
2 TABLET ORAL ONCE
Status: COMPLETED | OUTPATIENT
Start: 2019-04-25 | End: 2019-04-25

## 2019-04-25 RX ADMIN — TETANUS TOXOID, REDUCED DIPHTHERIA TOXOID AND ACELLULAR PERTUSSIS VACCINE, ADSORBED 0.5 ML: 5; 2.5; 8; 8; 2.5 SUSPENSION INTRAMUSCULAR at 13:28

## 2019-04-25 RX ADMIN — OXYCODONE HYDROCHLORIDE AND ACETAMINOPHEN 2 TABLET: 5; 325 TABLET ORAL at 12:57

## 2019-04-25 RX ADMIN — CEPHALEXIN 500 MG: 250 CAPSULE ORAL at 13:27

## 2019-04-25 ASSESSMENT — PAIN SCALES - GENERAL
PAINLEVEL_OUTOF10: 7
PAINLEVEL_OUTOF10: 8
PAINLEVEL_OUTOF10: 3

## 2019-04-25 ASSESSMENT — PAIN DESCRIPTION - PROGRESSION: CLINICAL_PROGRESSION: GRADUALLY IMPROVING

## 2019-04-25 ASSESSMENT — PAIN DESCRIPTION - ORIENTATION
ORIENTATION: LEFT
ORIENTATION: LEFT

## 2019-04-25 ASSESSMENT — PAIN DESCRIPTION - PAIN TYPE: TYPE: ACUTE PAIN

## 2019-04-25 ASSESSMENT — PAIN DESCRIPTION - LOCATION
LOCATION: FINGER (COMMENT WHICH ONE)
LOCATION: FINGER (COMMENT WHICH ONE)

## 2019-04-25 NOTE — ED NOTES
Affected finger soaking in warm water and chlorhexidine gluconate solution, pt tolerating well.      Ewa Segundo RN  04/25/19 4824

## 2019-04-25 NOTE — ED PROVIDER NOTES
SpO2 Height Weight   121/75 97.7 °F (36.5 °C) Oral 68 20 98 % 6' 1\" (1.854 m) 200 lb (90.7 kg)     Constitutional: Alert, oriented x3, nontoxic, answering questions appropriately, acting properly for age, in no acute distress   HEENT: Extraocular muscles intact, mucus membranes moist,    Neck: Trachea midline   Cardiovascular: Regular rhythm and rate no S3, S4, or murmurs   Respiratory: Clear to auscultation bilaterally no wheezes, rhonchi, rales, no respiratory distress no tachypnea no retractions no hypoxia  Gastrointestinal: Soft, nontender, nondistended, positive bowel sounds. No rebound, rigidity, or guarding. Musculoskeletal: Left upper extremity no pain at the shoulder elbow or wrist.  Radial and ulnar arteries intact capillary refill less than 2 seconds. There is an avulsion of the distal tip of the middle finger with full range of motion. It involves a very small portion of the nail but does not appear to involve the bone. Clean cut through and through. Neurologic: Moving all 4 extremities without difficulty there are no gross focal neurologic deficits   Skin: Warm and dry       Physical Exam   Musculoskeletal:        Hands:    DIFFERENTIAL DIAGNOSIS/ MDM:     Tetanus antibiotics pain control and dressing. No repair required. DIAGNOSTIC RESULTS     EKG: All EKG's are interpreted by [facility] who either signs or Co-signs this chart in the absence of a cardiologist.        Not indicated unless otherwise documented above    LABS:  No results found for this visit on 04/25/19. Not indicated unless otherwise documented above    RADIOLOGY:   I reviewed the radiologist interpretations:    XR FINGER LEFT (MIN 2 VIEWS)   Preliminary Result   Traumatic amputation of the soft tissues and distal tip of the 3rd distal   phalangeal tuft. No radiopaque foreign body.              Not indicated unless otherwise documented above    EMERGENCY DEPARTMENT COURSE:     The patient was given DISPOSITION          PATIENT REFERRED TO:  Basia Conde MD  [address]  [phone]    Schedule an appointment as soon as possible for a visit in 3 days        DISCHARGE MEDICATIONS:  New Prescriptions    CEPHALEXIN (KEFLEX) 500 MG CAPSULE    Take 1 capsule by mouth 4 times daily for 10 days    HYDROCODONE-ACETAMINOPHEN (NORCO) 5-325 MG PER TABLET    Take 1 tablet by mouth every 6 hours as needed for Pain for up to 7 days. WARNING:  May cause drowsiness. May impair ability to operate vehicles or machinery. Do not use in combination with alcohol.     OARRS Reviewed: ICD-10-CM Diagnosis Code R52 : Pain       (Please note that portions of thisnote were completed with a voice recognition program.  Efforts were made to edit the dictations but occasionally words are mis-transcribed.)    Christen Ackerman,,   Attending Emergency Physician       Christen Ackerman,   04/25/19 [address] Rosario DO  04/25/19 2454

## 2021-04-19 PROBLEM — M19.032 ARTHRITIS OF LEFT WRIST: Status: ACTIVE | Noted: 2021-04-19

## 2023-12-01 ENCOUNTER — HOSPITAL ENCOUNTER (EMERGENCY)
Age: 53
Discharge: HOME | End: 2023-12-01
Attending: EMERGENCY MEDICINE
Payer: COMMERCIAL

## 2023-12-01 VITALS
OXYGEN SATURATION: 97 % | DIASTOLIC BLOOD PRESSURE: 74 MMHG | HEIGHT: 73 IN | TEMPERATURE: 98.4 F | BODY MASS INDEX: 26.24 KG/M2 | SYSTOLIC BLOOD PRESSURE: 145 MMHG | RESPIRATION RATE: 18 BRPM | WEIGHT: 198 LBS | HEART RATE: 79 BPM

## 2023-12-01 DIAGNOSIS — J01.90 ACUTE NON-RECURRENT SINUSITIS, UNSPECIFIED LOCATION: Primary | ICD-10-CM

## 2023-12-01 PROCEDURE — 6370000000 HC RX 637 (ALT 250 FOR IP): Performed by: NURSE PRACTITIONER

## 2023-12-01 PROCEDURE — 99283 EMERGENCY DEPT VISIT LOW MDM: CPT

## 2023-12-01 RX ORDER — DOXYCYCLINE HYCLATE 100 MG
100 TABLET ORAL ONCE
Status: COMPLETED | OUTPATIENT
Start: 2023-12-01 | End: 2023-12-01

## 2023-12-01 RX ORDER — BENZONATATE 200 MG/1
200 CAPSULE ORAL 3 TIMES DAILY PRN
Qty: 30 CAPSULE | Refills: 0 | Status: SHIPPED | OUTPATIENT
Start: 2023-12-01 | End: 2023-12-08

## 2023-12-01 RX ORDER — PREDNISONE 20 MG/1
20 TABLET ORAL DAILY
Qty: 5 TABLET | Refills: 0 | Status: SHIPPED | OUTPATIENT
Start: 2023-12-01 | End: 2023-12-06

## 2023-12-01 RX ORDER — DOXYCYCLINE HYCLATE 100 MG
100 TABLET ORAL 2 TIMES DAILY
Qty: 14 TABLET | Refills: 0 | Status: SHIPPED | OUTPATIENT
Start: 2023-12-01 | End: 2023-12-08

## 2023-12-01 RX ADMIN — DOXYCYCLINE HYCLATE 100 MG: 100 TABLET, FILM COATED ORAL at 20:03

## 2023-12-01 ASSESSMENT — ENCOUNTER SYMPTOMS
COUGH: 1
SINUS PRESSURE: 1
SORE THROAT: 1
SHORTNESS OF BREATH: 0
RHINORRHEA: 1
DIARRHEA: 0
VOMITING: 0

## 2023-12-01 ASSESSMENT — PAIN DESCRIPTION - LOCATION: LOCATION: THROAT

## 2023-12-01 ASSESSMENT — PAIN - FUNCTIONAL ASSESSMENT: PAIN_FUNCTIONAL_ASSESSMENT: 0-10

## 2023-12-02 NOTE — ED PROVIDER NOTES
[address] [facility], [place] Emergency Department  [address] [address] [facility]. [facility] [address]  Phone: [phone]  Fax: [phone]        Pt Name: Taryn King  MRN: [id number]  [address] [facility] [place] 1970  Date of evaluation: 12/1/23    [address]       Chief Complaint   Patient presents with    Cold Exposure     PT presents to the ED for cold like sx including a cough, L ear fullness, HA and sinus drainage. SX have been ongoing since Monday. HISTORY OF PRESENT ILLNESS (Location/Symptom, Timing/Onset, Context/Setting, Quality, Duration, Modifying Factors, Severity)      Taryn King is a 46 y.o. male with no pertinent PMH who presents to the ED via private auto with concern for cough congestion. Patient reportedly has had symptoms ongoing for 5 days. Reports frontal maxillary sinus pressure. Scratchy sore throat mild cough and left ear pain. Patient describes frontal  Sinus fullness. Unknwon sick contacts  Has treated with Mucinex. PAST MEDICAL / SURGICAL / SOCIAL / FAMILY HISTORY     PMH:  has a past medical history of Chronic back pain, Depression, Headache, and Hyperlipidemia. Surgical History:  has a past surgical history that includes Ankle surgery (2006); Testicle surgery; lumbar laminectomy (08/16/2018); and pr laminectomy w/o ffd > 2 vert seg lumbar (Left, 8/16/2018). Social History:  reports that he has quit smoking. His smoking use included cigarettes. He has never used smokeless tobacco. He reports current alcohol use. He reports that he does not use drugs. Family History: He indicated that his mother is alive. He indicated that his father is alive. family history is not on file. Psychiatric History: None    Allergies: Morphine    Home Medications:   Prior to Admission medications    Medication Sig Start Date End Date Taking?  Authorizing Provider   doxycycline hyclate (VIBRA-TABS) 100 MG tablet Take 1 tablet by mouth 2 times daily for 7 days 12/1/23 12/8/23 Yes Eric 18      RE-EVALUATION:  See ED Course notes above. The patient and/or family and I have discussed the diagnosis and risks, and we agree with discharging home to follow-up with their pertinent providers. The patient appears stable for discharge and has been instructed to return immediately for new concerning symptoms or if the symptoms worsen in any way. The patient understands that at this time there is no evidence for a more malignant underlying process, but the patient also understands that early in the process of an illness or injury, an emergency department workup can be falsely reassuring. Routine discharge counseling was given, and the patient understands that worsening, changing or persistent symptoms should prompt an immediate call or follow up with their primary physician or return to the emergency department. I have reviewed the disposition diagnosis with the patient and or their family/guardian. I have answered their questions and given discharge instructions. They voiced understanding of these instructions and did not have any further questions or complaints. This patient was seen by the attending physician and they agreed with the assessment and plan. CONSULTS:  None    PROCEDURES:  None    FINAL IMPRESSION      1. Acute non-recurrent sinusitis, unspecified location          DISPOSITION / PLAN     CONDITION ON DISPOSITION:   Good / Stable for discharge. PATIENT REFERRED TO:  [address] [place] [address] Emergency Department  [address]   [address] [address] [phone]    As needed    Juan José Blank, [address] [facility] [address] [facility] [address] [phone]    Schedule an appointment as soon as possible for a visit         DISCHARGE MEDICATIONS:  New Prescriptions    BENZONATATE (TESSALON) 200 MG CAPSULE    Take 1 capsule by mouth 3 times daily as needed for Cough    DOXYCYCLINE HYCLATE (VIBRA-TABS) 100 MG TABLET    Take 1 tablet by mouth 2 times daily for 7 days

## 2023-12-02 NOTE — DISCHARGE INSTRUCTIONS
Take antibiotics, steroids, cough medication as prescribed. Follow-up with your primary care provider. Return to the ER for any new or worsening symptoms.

## (undated) DEVICE — GLOVE SURG SZ 65 THK91MIL LTX FREE SYN POLYISOPRENE

## (undated) DEVICE — TOWEL,OR,DSP,ST,NATURAL,DLX,4/PK,20PK/CS: Brand: MEDLINE

## (undated) DEVICE — 3.0MM PRECISION NEURO (MATCH HEAD)

## (undated) DEVICE — PROTECTOR ULN NRV PUR FOAM HK LOOP STRP ANATOMICALLY

## (undated) DEVICE — 3M™ PRECISE™ VISTA DISPOSABLE SKIN STAPLER 3995: Brand: 3M™ PRECISE™

## (undated) DEVICE — STRIP,CLOSURE,WOUND,MEDI-STRIP,1/2X4: Brand: MEDLINE

## (undated) DEVICE — PACK PROCEDURE SURG LUMBAR SPINE SVMMC

## (undated) DEVICE — MITT PREP W575XL775IN POVIDONE IOD HAIR REMV

## (undated) DEVICE — DRESSING BORDERED ADH GZ UNIV GEN USE 5IN 4IN AND 2 1/2IN

## (undated) DEVICE — SUTURE VCRL SZ 2-0 L27IN ABSRB UD L36MM CT-1 1/2 CIR JJ42G

## (undated) DEVICE — CHLORAPREP 26ML ORANGE

## (undated) DEVICE — CONNECTOR,TUBING,5-IN-1,NON-STERILE: Brand: MEDLINE INDUSTRIES, INC.

## (undated) DEVICE — GARMENT,MEDLINE,DVT,INT,CALF,MED, GEN2: Brand: MEDLINE

## (undated) DEVICE — SUTURE D SPEC VCRL 2 0 D8876

## (undated) DEVICE — CONTROL SYRINGE LUER-LOCK TIP: Brand: MONOJECT

## (undated) DEVICE — SUTURE ABSORBABLE BRAIDED 0 CT-1 8X27 IN UD VICRYL JJ41G

## (undated) DEVICE — BLADE CLIPPER GEN PURP NS